# Patient Record
Sex: FEMALE | Race: OTHER | HISPANIC OR LATINO
[De-identification: names, ages, dates, MRNs, and addresses within clinical notes are randomized per-mention and may not be internally consistent; named-entity substitution may affect disease eponyms.]

---

## 2019-04-18 ENCOUNTER — APPOINTMENT (OUTPATIENT)
Dept: SURGERY | Facility: CLINIC | Age: 54
End: 2019-04-18
Payer: COMMERCIAL

## 2019-04-18 VITALS
HEIGHT: 66 IN | HEART RATE: 89 BPM | BODY MASS INDEX: 31.02 KG/M2 | WEIGHT: 193 LBS | DIASTOLIC BLOOD PRESSURE: 85 MMHG | TEMPERATURE: 98.5 F | SYSTOLIC BLOOD PRESSURE: 134 MMHG

## 2019-04-18 PROBLEM — Z00.00 ENCOUNTER FOR PREVENTIVE HEALTH EXAMINATION: Status: ACTIVE | Noted: 2019-04-18

## 2019-04-18 PROCEDURE — 99203 OFFICE O/P NEW LOW 30 MIN: CPT

## 2019-05-17 PROBLEM — Z87.19 HISTORY OF GASTRITIS: Status: RESOLVED | Noted: 2019-05-17 | Resolved: 2019-05-17

## 2019-05-17 PROBLEM — Z56.0 UNEMPLOYED: Status: ACTIVE | Noted: 2019-05-17

## 2019-05-17 PROBLEM — Z86.39 HISTORY OF HIGH CHOLESTEROL: Status: RESOLVED | Noted: 2019-05-17 | Resolved: 2019-05-17

## 2019-05-17 PROBLEM — Z86.59 HISTORY OF DEPRESSION: Status: RESOLVED | Noted: 2019-05-17 | Resolved: 2019-05-17

## 2019-05-17 PROBLEM — Z82.49 FH: HEART ATTACK: Status: ACTIVE | Noted: 2019-05-17

## 2019-05-17 PROBLEM — Z86.39 HISTORY OF OBESITY: Status: RESOLVED | Noted: 2019-05-17 | Resolved: 2019-05-17

## 2019-05-17 PROBLEM — Z87.39 HISTORY OF FIBROMYALGIA: Status: RESOLVED | Noted: 2019-05-17 | Resolved: 2019-05-17

## 2019-05-17 PROBLEM — Z82.49 FAMILY HISTORY OF ESSENTIAL HYPERTENSION: Status: ACTIVE | Noted: 2019-05-17

## 2019-05-17 PROBLEM — Z86.59 HISTORY OF BIPOLAR DISORDER: Status: RESOLVED | Noted: 2019-05-17 | Resolved: 2019-05-17

## 2019-05-17 PROBLEM — Z87.19 HISTORY OF APPENDICITIS: Status: RESOLVED | Noted: 2019-05-17 | Resolved: 2019-05-17

## 2019-05-17 PROBLEM — F17.210 CIGARETTE SMOKER: Status: ACTIVE | Noted: 2019-05-17

## 2019-05-17 PROBLEM — Z86.59 HISTORY OF PANIC ATTACKS: Status: RESOLVED | Noted: 2019-05-17 | Resolved: 2019-05-17

## 2019-05-20 ENCOUNTER — APPOINTMENT (OUTPATIENT)
Dept: SURGERY | Facility: CLINIC | Age: 54
End: 2019-05-20
Payer: COMMERCIAL

## 2019-05-20 VITALS
TEMPERATURE: 98.6 F | SYSTOLIC BLOOD PRESSURE: 110 MMHG | WEIGHT: 197 LBS | BODY MASS INDEX: 31.66 KG/M2 | HEIGHT: 66 IN | HEART RATE: 85 BPM | DIASTOLIC BLOOD PRESSURE: 72 MMHG

## 2019-05-20 DIAGNOSIS — Z56.0 UNEMPLOYMENT, UNSPECIFIED: ICD-10-CM

## 2019-05-20 DIAGNOSIS — Z82.49 FAMILY HISTORY OF ISCHEMIC HEART DISEASE AND OTHER DISEASES OF THE CIRCULATORY SYSTEM: ICD-10-CM

## 2019-05-20 DIAGNOSIS — Z86.59 PERSONAL HISTORY OF OTHER MENTAL AND BEHAVIORAL DISORDERS: ICD-10-CM

## 2019-05-20 DIAGNOSIS — Z87.39 PERSONAL HISTORY OF OTHER DISEASES OF THE MUSCULOSKELETAL SYSTEM AND CONNECTIVE TISSUE: ICD-10-CM

## 2019-05-20 DIAGNOSIS — Z87.19 PERSONAL HISTORY OF OTHER DISEASES OF THE DIGESTIVE SYSTEM: ICD-10-CM

## 2019-05-20 DIAGNOSIS — Z86.39 PERSONAL HISTORY OF OTHER ENDOCRINE, NUTRITIONAL AND METABOLIC DISEASE: ICD-10-CM

## 2019-05-20 DIAGNOSIS — F17.210 NICOTINE DEPENDENCE, CIGARETTES, UNCOMPLICATED: ICD-10-CM

## 2019-05-20 PROCEDURE — 99213 OFFICE O/P EST LOW 20 MIN: CPT

## 2019-05-20 SDOH — ECONOMIC STABILITY - INCOME SECURITY: UNEMPLOYMENT, UNSPECIFIED: Z56.0

## 2019-05-20 NOTE — REVIEW OF SYSTEMS
[As Noted in HPI] : as noted in HPI [Abdominal Pain] : abdominal pain [Vomiting] : no vomiting [Constipation] : no constipation [Diarrhea] : no diarrhea [Heartburn] : no heartburn [Melena] : no melena [Negative] : Heme/Lymph

## 2019-05-20 NOTE — DATA REVIEWED
[FreeTextEntry1] : RADIOLOGY REPORT   FINDINGS   FINDING History: Evaluate for gallstone.  Comparison: None available.  Technique: High resolution gray-scale sonographic evaluation of the abdomen was performed.  The liver is diffusely echogenic. No focal hepatic mass is identified. The gallbladder is distended with gallstones. There is no significant gallbladder wall thickening or pericholecystic fluid. The common bile duct is normal caliber measuring 4 mm. The pancreas is partially visualized. The spleen is normal in size measuring 12.1 cm length.   The right kidney measures 11.7 cm length. The left kidney measures 12.3 cm length. No evidence of hydronephrosis, perinephric fluid or renal calculus is noted.  The visualized portion of the abdominal aorta and IVC are unremarkable.  Impression:  Findings consistent with diffuse hepatic steatosis versus underlying hepatocellular disease. Cholelithiasis without sonographic evidence for acute cholecystitis.  Electronically signed by: Kendra Caruso MD 4/25/2019 10:34 AM   Workstation: [id number]

## 2019-05-20 NOTE — PHYSICAL EXAM
[Abdominal Masses] : No abdominal masses [Alert] : alert [Oriented to Person] : oriented to person [Oriented to Place] : oriented to place [Oriented to Time] : oriented to time [Calm] : calm [de-identified] : The patient is alert, well-groomed, and cheerful. [de-identified] :  Neck supple. Trachea midline. Thyroid isthmus barely palpable, lobes not felt. [de-identified] : No signs of  dyspnea, orthopnea. Good S1 and  S2 [de-identified] :  mild right upper quadrant tenderness with no guarding and no rebound

## 2019-05-20 NOTE — PLAN
[FreeTextEntry1] : \par Patient was told significance of findings, options, risks and benefits were explained.  We will arrange for pre-surgical testing and schedule the patient for Laparoscopic/ possible open gallbladder  removal at [facility].

## 2019-05-20 NOTE — HISTORY OF PRESENT ILLNESS
[de-identified] : This is a 54 years old patient who  presented on 04/18/2019 with the chief complaint of having right upper quadrant and epigastric pain for 3 years.  patient had results of the abd sonogram from 2015 that showed multiple GB stones. Patient was areferred for a repeat US of the abdomen that she had on 04/25/2019. the results showed Cholelithiasis without sonographic evidence for acute cholecystitis. today patient states that she has episodes of right upper quadrant pain . On Examination patient has  mild right upper quadrant tenderness with no guarding and no rebound. Patient reports good bowel movements and appetite. \par \par

## 2019-05-20 NOTE — ASSESSMENT
[FreeTextEntry1] : This is a 54 years old patient who  presented on 04/18/2019 with the chief complaint of having right upper quadrant and epigastric pain for 3 years.  patient had results of the abd sonogram from 2015 that showed multiple GB stones. Patient was areferred for a repeat US of the abdomen that she had on 04/25/2019. the results showed Cholelithiasis without sonographic evidence for acute cholecystitis. today patient states that she has episodes of right upper quadrant pain . On Examination patient has  mild right upper quadrant tenderness with no guarding and no rebound. Patient reports good bowel movements and appetite. results of the US and treatment/ no treatment  options discussed in details. patient is asking for a surgical GB removal

## 2019-06-07 ENCOUNTER — OUTPATIENT (OUTPATIENT)
Dept: OUTPATIENT SERVICES | Facility: HOSPITAL | Age: 54
LOS: 1 days | End: 2019-06-07
Payer: COMMERCIAL

## 2019-06-07 VITALS
TEMPERATURE: 98 F | HEART RATE: 80 BPM | HEIGHT: 66 IN | RESPIRATION RATE: 18 BRPM | SYSTOLIC BLOOD PRESSURE: 121 MMHG | OXYGEN SATURATION: 100 % | WEIGHT: 194.01 LBS | DIASTOLIC BLOOD PRESSURE: 84 MMHG

## 2019-06-07 DIAGNOSIS — Z90.49 ACQUIRED ABSENCE OF OTHER SPECIFIED PARTS OF DIGESTIVE TRACT: Chronic | ICD-10-CM

## 2019-06-07 DIAGNOSIS — Z98.890 OTHER SPECIFIED POSTPROCEDURAL STATES: Chronic | ICD-10-CM

## 2019-06-07 DIAGNOSIS — E78.5 HYPERLIPIDEMIA, UNSPECIFIED: ICD-10-CM

## 2019-06-07 DIAGNOSIS — F32.9 MAJOR DEPRESSIVE DISORDER, SINGLE EPISODE, UNSPECIFIED: ICD-10-CM

## 2019-06-07 DIAGNOSIS — K81.9 CHOLECYSTITIS, UNSPECIFIED: ICD-10-CM

## 2019-06-07 DIAGNOSIS — K29.70 GASTRITIS, UNSPECIFIED, WITHOUT BLEEDING: ICD-10-CM

## 2019-06-07 DIAGNOSIS — Z01.818 ENCOUNTER FOR OTHER PREPROCEDURAL EXAMINATION: ICD-10-CM

## 2019-06-07 DIAGNOSIS — K80.20 CALCULUS OF GALLBLADDER WITHOUT CHOLECYSTITIS WITHOUT OBSTRUCTION: ICD-10-CM

## 2019-06-07 DIAGNOSIS — Z90.89 ACQUIRED ABSENCE OF OTHER ORGANS: Chronic | ICD-10-CM

## 2019-06-07 DIAGNOSIS — Z98.42 CATARACT EXTRACTION STATUS, LEFT EYE: Chronic | ICD-10-CM

## 2019-06-07 DIAGNOSIS — Z98.41 CATARACT EXTRACTION STATUS, RIGHT EYE: Chronic | ICD-10-CM

## 2019-06-07 DIAGNOSIS — Z98.84 BARIATRIC SURGERY STATUS: Chronic | ICD-10-CM

## 2019-06-07 DIAGNOSIS — Z98.891 HISTORY OF UTERINE SCAR FROM PREVIOUS SURGERY: Chronic | ICD-10-CM

## 2019-06-07 LAB — BLD GP AB SCN SERPL QL: SIGNIFICANT CHANGE UP

## 2019-06-07 PROCEDURE — 86900 BLOOD TYPING SEROLOGIC ABO: CPT

## 2019-06-07 PROCEDURE — G0463: CPT

## 2019-06-07 PROCEDURE — 86901 BLOOD TYPING SEROLOGIC RH(D): CPT

## 2019-06-07 PROCEDURE — 36415 COLL VENOUS BLD VENIPUNCTURE: CPT

## 2019-06-07 PROCEDURE — 86850 RBC ANTIBODY SCREEN: CPT

## 2019-06-07 NOTE — H&P PST ADULT - ASSESSMENT
4 yr old female with PMH of  depression, fibromyalgia, panic attacks, gastritis, bipolar disorder, Hyperlipidemia presents with cholelithiasis. Pt is scheduled for laparoscopic cholecystectomy with intraoperative cholangiogram, possible open on 6/12/2019.

## 2019-06-07 NOTE — H&P PST ADULT - NSICDXFAMILYHX_GEN_ALL_CORE_FT
FAMILY HISTORY:  Family history of diabetes mellitus in mother  Family history of hyperlipidemia  Family history of hypertension in mother  Family history of myocardial infarction  Family history of polycystic kidney disease  Family history of renal disease  Family history of renal failure

## 2019-06-07 NOTE — H&P PST ADULT - NSANTHOSAYNRD_GEN_A_CORE
No. KITTY screening performed.  STOP BANG Legend: 0-2 = LOW Risk; 3-4 = INTERMEDIATE Risk; 5-8 = HIGH Risk

## 2019-06-07 NOTE — H&P PST ADULT - NSICDXPROBLEM_GEN_ALL_CORE_FT
PROBLEM DIAGNOSES  Problem: Gastritis  Assessment and Plan: Continue Dexilant and take with sips of water on day of surgery.    Problem: HLD (hyperlipidemia)  Assessment and Plan:     Problem: Depression  Assessment and Plan: Encouraged to continue meds and take with sips of water on day of surgery. Follow-up with PROVID[facility] FOR MANAGEMENT.    Problem: Cholelithiasis  Assessment and Plan: Laparoscopic cholecystectomy with intraoperative cholangiogram, possible open on 6/12/2019. Preoperative instructions given

## 2019-06-07 NOTE — H&P PST ADULT - NSICDXPASTMEDICALHX_GEN_ALL_CORE_FT
PAST MEDICAL HISTORY:  Cholelithiasis     Depression     Fibromyalgia     Gastritis     HLD (hyperlipidemia)     Panic attacks

## 2019-06-07 NOTE — H&P PST ADULT - NSICDXPASTSURGICALHX_GEN_ALL_CORE_FT
PAST SURGICAL HISTORY:  H/O gastric bypass gastric sleeve in 2010    History of abdominoplasty     History of appendectomy     History of  section     History of tonsillectomy PAST SURGICAL HISTORY:  H/O gastric bypass gastric sleeve in     H/O right cataract extraction 2019    History of abdominoplasty     History of appendectomy     History of  section     History of left cataract extraction 2019    History of tonsillectomy

## 2019-06-07 NOTE — H&P PST ADULT - RS GEN PE MLT RESP DETAILS PC
no rhonchi/no wheezes/good air movement/normal/respirations non-labored/breath sounds equal/no rales/airway patent/no chest wall tenderness/no intercostal retractions/clear to auscultation bilaterally/no subcutaneous emphysema

## 2019-06-07 NOTE — H&P PST ADULT - HISTORY OF PRESENT ILLNESS
54 yr old female with PMH of  depression, fibromyalgia, panic attacks, gastritis, bipolar disorder, Hyperlipidemia presents with c/o intermittent abdominal pain due to gallstones. Pt reports feeling nauseous before meals and worsening of pain after ingestion of fatty meals. Pt for laparoscopic cholecystectomy with intraoperative cholangiogram, possible open on 6/12/2019. 54 yr old female with PMH of  depression, fibromyalgia, panic attacks, gastritis, bipolar disorder, Hyperlipidemia presents with c/o intermittent abdominal pain due to gallstones. Pt reports feeling bloated and worsening of pain after ingestion of  meals. Pt for laparoscopic cholecystectomy with intraoperative cholangiogram, possible open on 6/12/2019.

## 2019-06-11 ENCOUNTER — TRANSCRIPTION ENCOUNTER (OUTPATIENT)
Age: 54
End: 2019-06-11

## 2019-06-12 ENCOUNTER — OUTPATIENT (OUTPATIENT)
Dept: OUTPATIENT SERVICES | Facility: HOSPITAL | Age: 54
LOS: 1 days | End: 2019-06-12
Payer: COMMERCIAL

## 2019-06-12 ENCOUNTER — APPOINTMENT (OUTPATIENT)
Dept: SURGERY | Facility: HOSPITAL | Age: 54
End: 2019-06-12
Payer: COMMERCIAL

## 2019-06-12 ENCOUNTER — RESULT REVIEW (OUTPATIENT)
Age: 54
End: 2019-06-12

## 2019-06-12 VITALS
RESPIRATION RATE: 16 BRPM | OXYGEN SATURATION: 98 % | WEIGHT: 194.01 LBS | SYSTOLIC BLOOD PRESSURE: 133 MMHG | HEART RATE: 71 BPM | HEIGHT: 66 IN | DIASTOLIC BLOOD PRESSURE: 71 MMHG | TEMPERATURE: 99 F

## 2019-06-12 VITALS
SYSTOLIC BLOOD PRESSURE: 116 MMHG | DIASTOLIC BLOOD PRESSURE: 76 MMHG | TEMPERATURE: 98 F | RESPIRATION RATE: 16 BRPM | OXYGEN SATURATION: 100 % | HEART RATE: 77 BPM

## 2019-06-12 DIAGNOSIS — K81.9 CHOLECYSTITIS, UNSPECIFIED: ICD-10-CM

## 2019-06-12 DIAGNOSIS — Z90.89 ACQUIRED ABSENCE OF OTHER ORGANS: Chronic | ICD-10-CM

## 2019-06-12 DIAGNOSIS — Z98.84 BARIATRIC SURGERY STATUS: Chronic | ICD-10-CM

## 2019-06-12 DIAGNOSIS — Z98.890 OTHER SPECIFIED POSTPROCEDURAL STATES: Chronic | ICD-10-CM

## 2019-06-12 DIAGNOSIS — Z98.41 CATARACT EXTRACTION STATUS, RIGHT EYE: Chronic | ICD-10-CM

## 2019-06-12 DIAGNOSIS — Z01.818 ENCOUNTER FOR OTHER PREPROCEDURAL EXAMINATION: ICD-10-CM

## 2019-06-12 DIAGNOSIS — Z90.49 ACQUIRED ABSENCE OF OTHER SPECIFIED PARTS OF DIGESTIVE TRACT: Chronic | ICD-10-CM

## 2019-06-12 DIAGNOSIS — Z98.891 HISTORY OF UTERINE SCAR FROM PREVIOUS SURGERY: Chronic | ICD-10-CM

## 2019-06-12 DIAGNOSIS — Z98.42 CATARACT EXTRACTION STATUS, LEFT EYE: Chronic | ICD-10-CM

## 2019-06-12 LAB — BLD GP AB SCN SERPL QL: SIGNIFICANT CHANGE UP

## 2019-06-12 PROCEDURE — 88304 TISSUE EXAM BY PATHOLOGIST: CPT

## 2019-06-12 PROCEDURE — 86850 RBC ANTIBODY SCREEN: CPT

## 2019-06-12 PROCEDURE — 86900 BLOOD TYPING SEROLOGIC ABO: CPT

## 2019-06-12 PROCEDURE — 76000 FLUOROSCOPY <1 HR PHYS/QHP: CPT

## 2019-06-12 PROCEDURE — 36415 COLL VENOUS BLD VENIPUNCTURE: CPT

## 2019-06-12 PROCEDURE — 47563 LAPARO CHOLECYSTECTOMY/GRAPH: CPT | Mod: AS

## 2019-06-12 PROCEDURE — 47563 LAPARO CHOLECYSTECTOMY/GRAPH: CPT

## 2019-06-12 PROCEDURE — 88304 TISSUE EXAM BY PATHOLOGIST: CPT | Mod: 26

## 2019-06-12 PROCEDURE — 86901 BLOOD TYPING SEROLOGIC RH(D): CPT

## 2019-06-12 RX ORDER — FLUOXETINE HCL 10 MG
2 CAPSULE ORAL
Qty: 0 | Refills: 0 | DISCHARGE

## 2019-06-12 RX ORDER — NEPAFENAC 3 MG/ML
1 SUSPENSION OPHTHALMIC
Qty: 0 | Refills: 0 | DISCHARGE

## 2019-06-12 RX ORDER — HYDROMORPHONE HYDROCHLORIDE 2 MG/ML
1 INJECTION INTRAMUSCULAR; INTRAVENOUS; SUBCUTANEOUS
Refills: 0 | Status: DISCONTINUED | OUTPATIENT
Start: 2019-06-12 | End: 2019-06-12

## 2019-06-12 RX ORDER — ONDANSETRON 8 MG/1
4 TABLET, FILM COATED ORAL ONCE
Refills: 0 | Status: DISCONTINUED | OUTPATIENT
Start: 2019-06-12 | End: 2019-06-12

## 2019-06-12 RX ORDER — HYDROMORPHONE HYDROCHLORIDE 2 MG/ML
0.5 INJECTION INTRAMUSCULAR; INTRAVENOUS; SUBCUTANEOUS
Refills: 0 | Status: DISCONTINUED | OUTPATIENT
Start: 2019-06-12 | End: 2019-06-12

## 2019-06-12 RX ORDER — OXCARBAZEPINE 300 MG/1
1.5 TABLET, FILM COATED ORAL
Qty: 0 | Refills: 0 | DISCHARGE

## 2019-06-12 RX ORDER — LINACLOTIDE 145 UG/1
1 CAPSULE, GELATIN COATED ORAL
Qty: 0 | Refills: 0 | DISCHARGE

## 2019-06-12 RX ORDER — ACETAMINOPHEN 500 MG
650 TABLET ORAL ONCE
Refills: 0 | Status: COMPLETED | OUTPATIENT
Start: 2019-06-12 | End: 2019-06-12

## 2019-06-12 RX ORDER — ACETAMINOPHEN 500 MG
2 TABLET ORAL
Qty: 0 | Refills: 0 | DISCHARGE

## 2019-06-12 RX ORDER — OXCARBAZEPINE 300 MG/1
0.5 TABLET, FILM COATED ORAL
Qty: 0 | Refills: 0 | DISCHARGE

## 2019-06-12 RX ORDER — IBUPROFEN 200 MG
1 TABLET ORAL
Qty: 0 | Refills: 0 | DISCHARGE

## 2019-06-12 RX ORDER — SODIUM CHLORIDE 9 MG/ML
1000 INJECTION, SOLUTION INTRAVENOUS
Refills: 0 | Status: DISCONTINUED | OUTPATIENT
Start: 2019-06-12 | End: 2019-06-12

## 2019-06-12 RX ORDER — ACETAMINOPHEN WITH CODEINE 300MG-30MG
1 TABLET ORAL EVERY 4 HOURS
Refills: 0 | Status: DISCONTINUED | OUTPATIENT
Start: 2019-06-12 | End: 2019-06-12

## 2019-06-12 RX ORDER — ROSUVASTATIN CALCIUM 5 MG/1
1 TABLET ORAL
Qty: 0 | Refills: 0 | DISCHARGE

## 2019-06-12 RX ORDER — SODIUM CHLORIDE 9 MG/ML
1000 INJECTION INTRAMUSCULAR; INTRAVENOUS; SUBCUTANEOUS
Refills: 0 | Status: DISCONTINUED | OUTPATIENT
Start: 2019-06-12 | End: 2019-06-20

## 2019-06-12 RX ORDER — DEXLANSOPRAZOLE 30 MG/1
1 CAPSULE, DELAYED RELEASE ORAL
Qty: 0 | Refills: 0 | DISCHARGE

## 2019-06-12 RX ORDER — SODIUM CHLORIDE 9 MG/ML
3 INJECTION INTRAMUSCULAR; INTRAVENOUS; SUBCUTANEOUS EVERY 8 HOURS
Refills: 0 | Status: DISCONTINUED | OUTPATIENT
Start: 2019-06-12 | End: 2019-06-12

## 2019-06-12 RX ORDER — DUREZOL 0.5 MG/ML
1 EMULSION OPHTHALMIC
Qty: 0 | Refills: 0 | DISCHARGE

## 2019-06-12 RX ORDER — GABAPENTIN 400 MG/1
1 CAPSULE ORAL
Qty: 0 | Refills: 0 | DISCHARGE

## 2019-06-12 RX ADMIN — Medication 650 MILLIGRAM(S): at 12:45

## 2019-06-12 RX ADMIN — Medication 650 MILLIGRAM(S): at 12:15

## 2019-06-12 RX ADMIN — HYDROMORPHONE HYDROCHLORIDE 0.5 MILLIGRAM(S): 2 INJECTION INTRAMUSCULAR; INTRAVENOUS; SUBCUTANEOUS at 10:21

## 2019-06-12 NOTE — PROGRESS NOTE ADULT - SUBJECTIVE AND OBJECTIVE BOX
post op noted blood at lateral port with small hematoma,, expressed,,, pressure dressing applied .  In pacu, pt remains stable with vitals normal. no evidence of further bleed. dressing cdi. Abd: soft nt nd. no evidence of hematoma.    Dr Sood notified. Pt planned for discharge.

## 2019-06-12 NOTE — ASU DISCHARGE PLAN (ADULT/PEDIATRIC) - PAIN MANAGEMENT
Prescriptions electronically submitted to pharmacy from doctor's office/Prescriptions electronically submitted to pharmacy from [facility]

## 2019-06-12 NOTE — ASU DISCHARGE PLAN (ADULT/PEDIATRIC) - CALL YOUR DOCTOR IF YOU HAVE ANY OF THE FOLLOWING:
Fever greater than (need to indicate Fahrenheit or Celsius) Fever greater than (need to indicate Fahrenheit or Celsius)/Pain not relieved by Medications/Wound/Surgical Site with redness, or foul smelling discharge or pus

## 2019-06-12 NOTE — ASU DISCHARGE PLAN (ADULT/PEDIATRIC) - ASU DC SPECIAL INSTRUCTIONSFT
Keep dressing dry, clean, intact, for 3 days. After 3 days, remove dressing and leave steri strips on. You can shower with steri strips on.  If steri-strip falls off after shower its OK, if not you leave it there, it will fall off by itself in 2-3 days.

## 2019-06-13 RX ORDER — KETOROLAC TROMETHAMINE 30 MG/ML
1 SYRINGE (ML) INJECTION
Qty: 20 | Refills: 0
Start: 2019-06-13 | End: 2019-06-17

## 2019-06-17 PROBLEM — K80.20 CALCULUS OF GALLBLADDER WITHOUT CHOLECYSTITIS WITHOUT OBSTRUCTION: Chronic | Status: ACTIVE | Noted: 2019-06-07

## 2019-06-17 PROBLEM — E78.5 HYPERLIPIDEMIA, UNSPECIFIED: Chronic | Status: ACTIVE | Noted: 2019-06-07

## 2019-06-17 PROBLEM — F32.9 MAJOR DEPRESSIVE DISORDER, SINGLE EPISODE, UNSPECIFIED: Chronic | Status: ACTIVE | Noted: 2019-06-07

## 2019-06-17 PROBLEM — K29.70 GASTRITIS, UNSPECIFIED, WITHOUT BLEEDING: Chronic | Status: ACTIVE | Noted: 2019-06-07

## 2019-06-17 PROBLEM — M79.7 FIBROMYALGIA: Chronic | Status: ACTIVE | Noted: 2019-06-07

## 2019-06-17 PROBLEM — F41.0 PANIC DISORDER [EPISODIC PAROXYSMAL ANXIETY]: Chronic | Status: ACTIVE | Noted: 2019-06-07

## 2019-06-17 PROBLEM — K80.20 CHOLELITHIASIS: Status: ACTIVE | Noted: 2019-05-17

## 2019-06-17 LAB — SURGICAL PATHOLOGY STUDY: SIGNIFICANT CHANGE UP

## 2019-06-17 RX ORDER — FLUOXETINE HYDROCHLORIDE 60 MG/1
TABLET ORAL
Refills: 0 | Status: ACTIVE | COMMUNITY

## 2019-06-17 RX ORDER — ROSUVASTATIN CALCIUM 5 MG/1
TABLET, FILM COATED ORAL
Refills: 0 | Status: ACTIVE | COMMUNITY

## 2019-06-17 RX ORDER — PREGABALIN 300 MG/1
CAPSULE ORAL
Refills: 0 | Status: ACTIVE | COMMUNITY

## 2019-06-24 ENCOUNTER — APPOINTMENT (OUTPATIENT)
Dept: SURGERY | Facility: CLINIC | Age: 54
End: 2019-06-24
Payer: COMMERCIAL

## 2019-06-24 DIAGNOSIS — K80.20 CALCULUS OF GALLBLADDER W/OUT CHOLECYSTITIS W/OUT OBSTRUCTION: ICD-10-CM

## 2019-06-24 PROCEDURE — 99024 POSTOP FOLLOW-UP VISIT: CPT

## 2019-06-24 NOTE — HISTORY OF PRESENT ILLNESS
[de-identified] : Patient is s/p laparoscopic  cholecystectomy  on 06/12/2019.  Today patient offers no complaints. patient reports no fever, chills,  or  pain.  Surgical wounds are  healing well. No signs of inflammation, infection or exudate. patient reports occasional discomfort in the right lower quadrant when she drives.  Patient reports good bowel movements and appetite. \par

## 2019-06-24 NOTE — PHYSICAL EXAM
[de-identified] : The patient is alert, well-groomed, and cheerful. [de-identified] : Surgical wounds are  healing well.   no signs of  inflammation or infection.

## 2019-06-24 NOTE — ASSESSMENT
[FreeTextEntry1] : Patient is doing well, with excellent post-operative recovery. All surgical incisions are healing well and as expected. There is no evidence of infection or complication, and patient is progressing as expected. Post-operative wound care, activity, restrictions and precautions reinforced. Patient instructed to refrain from any heavy lifting greater than 10-15 pounds for at least 4  weeks post-operatively. path discussed.  Patient's questions and concerns addressed to patient's satisfaction.\par

## 2019-07-12 ENCOUNTER — APPOINTMENT (OUTPATIENT)
Dept: SURGERY | Facility: HOSPITAL | Age: 54
End: 2019-07-12

## 2023-08-14 NOTE — H&P PST ADULT - NS HIV RISK FACTOR NO
Asc Procedure Text (A): After obtaining clear surgical margins the patient was sent to an ASC for surgical repair.  The patient understands they will receive post-surgical care and follow-up from the ASC physician. No, Declined

## 2024-07-22 ENCOUNTER — LABORATORY RESULT (OUTPATIENT)
Age: 59
End: 2024-07-22

## 2024-07-22 ENCOUNTER — APPOINTMENT (OUTPATIENT)
Dept: CARDIOLOGY | Facility: CLINIC | Age: 59
End: 2024-07-22

## 2024-07-22 ENCOUNTER — NON-APPOINTMENT (OUTPATIENT)
Age: 59
End: 2024-07-22

## 2024-07-22 VITALS
HEART RATE: 80 BPM | BODY MASS INDEX: 25.88 KG/M2 | HEIGHT: 66 IN | SYSTOLIC BLOOD PRESSURE: 115 MMHG | DIASTOLIC BLOOD PRESSURE: 77 MMHG | WEIGHT: 161 LBS | OXYGEN SATURATION: 97 %

## 2024-07-22 DIAGNOSIS — I45.81 LONG QT SYNDROME: ICD-10-CM

## 2024-07-22 DIAGNOSIS — I42.8 OTHER CARDIOMYOPATHIES: ICD-10-CM

## 2024-07-22 DIAGNOSIS — Z82.41 FAMILY HISTORY OF SUDDEN CARDIAC DEATH: ICD-10-CM

## 2024-07-22 DIAGNOSIS — R55 SYNCOPE AND COLLAPSE: ICD-10-CM

## 2024-07-22 PROCEDURE — 93000 ELECTROCARDIOGRAM COMPLETE: CPT

## 2024-07-22 PROCEDURE — 99204 OFFICE O/P NEW MOD 45 MIN: CPT | Mod: 25

## 2024-07-22 NOTE — HISTORY OF PRESENT ILLNESS
[FreeTextEntry1] : ROLDAN SONG is a 58 yo F PMH bipolar disorder, anxiety, HLD, hypertriglyceridemia, preDM . Clinically there is concern for ARVC vs long QT her family history is significant for SCD.  2 months prior had syncope neurology workup was negative, with facial trauma no prodrome, thinks short LOC, had pain in her head but no post syncopal confusion    similar syncopal event 5 yrs ago, with facial trauma broke 5 teeth today she  is referred for a cardiogenomic evaluation

## 2024-07-22 NOTE — PLAN
[TextEntry] :  1.	Informed Consent obtained for the combined cardiac sequencing and Del/Dup panel 138 genes (#685)   2.	Blood drawn today to be sent to [facility] for analysis, pending insurance authorization.  3.	 Ms. ROLDAN SONG  was provided an information sheet about her genetic testing.  4.	A follow-up appointment was scheduled in 2-3 months to discuss genetic testing results in person. Results generally return in 6-8 weeks.  For any additional questions please call  Yamileth Simons MS, CGC or Javi Covarrubias MD, PhD at [phone] Time spent counseling the patient during the visit was 60 min. >50% time spent in care coordination /counseling for discussion of cardiac risk and appropriate management.   patient seen with Yamileth Simons MS, CGC for genetic counselling

## 2024-07-22 NOTE — DISCUSSION/SUMMARY
[TextEntry] : We reviewed the risks, benefits, limitations, and implications of genetic testing.  Additionally, we examined the patients motivation for testing, and the emotional ramifications of the test results.  The patient is aware that results may impact family members.  Counseling resources are available if requested.   [place], the Genetic Information Non-discrimination Act protects most people from discrimination in health insurance and employment at firms with over 50 employees.  [place]UL does not protect against use of genetic information by life insurance, disability, or long-term care insurers.  Further information on other limitations is available at [email or web address].   After a review of testing options, the patient elected to the combined cardiac  panel offered through [facility]. . Panels contain 138  genes associated with varying levels of risk for cardiomyopathy and arrythmia. We reviewed the three possible results for each gene on this panel: positive, negative and variant of uncertain significance (VUS).  We discussed that panel testing could result in incidental findings, such as identifying a positive result in a gene with cardiac risks not seen in the current personal or discussed family history. If results are positive, we would discuss the  risks and management options associated with that cardiac condition susceptibility gene and discuss genetic testing for family members.  Pedigree was reviewed with the patient to identify those who should be tested if the patient is positive.  If results are negative or a VUS is identified, the patient would continue to be managed based on personal and family history of their  cardiac condition.

## 2024-07-22 NOTE — PHYSICAL EXAM
[Extraocular Movements Intact] : extraocular movements were intact [Pectus Deformity] : no pectus deformity [Normal] : without joint laxity or contractures [Scoliosis] : no scoliosis [Tremor] : no tremor [de-identified] : 5/5 Ue and LE strength

## 2024-07-22 NOTE — ASSESSMENT
[TextEntry] : ROLDAN OSNG  is a 59 year F  with a history of bipolar disorder, anxiety, HLD, hypertriglyceridemia, . Clinically there is concern for ARVC vs long QT her family history is significant for SCD. The differences between hereditary and sporadic cardiomyopathy, arrythmia and syncope were reviewed with the patient.  She  has elected to undergo genetic testing due to concerns for  personal history, definitive diagnosis, disease management, family history, concern for the health of family members . Results may change medical management for the patient and may affect the healthcare of other family members. She  expressed understanding of the presented information and satisfaction with having all of Her questions and concerns addressed

## 2024-07-22 NOTE — CONSULT LETTER
[Dear  ___] : Dear ~CLOTILDE, [Consult Letter:] : I had the pleasure of evaluating your patient, [unfilled]. [Please see my note below.] : Please see my note below. [Consult Closing:] : Thank you very much for allowing me to participate in the care of this patient.  If you have any questions, please do not hesitate to contact me. [Sincerely,] : Sincerely, [DrPatrice  ___] : Dr. COSTELLO [DrPatrice ___] : Dr. COSTELLO [FreeTextEntry3] : Javi Covarrubias MD, PhD  Medical Director Program for Cardiac Genetics, Genomics and Precision Medicine Department of Cardiology [facility]  [employer] at [address] [place] [address] Tel: [phone] Fax: [phone]  ([facility] office) [address], 3rd floor (between [address] and [address]) [place] [address] (p) [phone] (f) [phone]

## 2024-07-22 NOTE — SIGNATURES
[TextEntry] : Javi Covarrubias MD, PhD  Medical Director Program for Cardiac Genetics, Genomics and Precision Medicine Department of Cardiology [facility]  [employer] at [address] [place] [address] Tel: [phone] Fax: [phone]  ([facility] office) [address], 3rd floor (between [address] and [address]) [place] [address] (p) [phone] (f) [phone]

## 2024-07-22 NOTE — FAMILY HISTORY
[FreeTextEntry1] : FamilyHistory_20_twCiteListControlStart FamilyHistory_20_twCiteListControlEnd [id number]  A four-generation family history was constructed and scanned into [employer].  Family history is significant for:  siblings brother 58 yo, hx "heart attack " 1 year ago, details unclear. father dec 59 hx cardiomyopathy, hx polycystic kidney disease paternal aunt polycystic kidney disease  children: 39 yo son hx palpitations, symptomatic  her maternal families originate from  and paternal families originate from [facility]  No Ashkenazi Catholic ancestry.  Family history was  negative for consanguinity   No family history of SIDS    [FreeTextEntry3] : [place]

## 2024-07-22 NOTE — REASON FOR VISIT
[FreeTextEntry3] : Dear Dr. Cortez and Dr. Ellington        . I saw your patient ROLDAN SONG on 07/22/2024 . Please see the note below for the assessment and plan.   ROLDAN SONG  was seen  for an initial consultation at the Cardiogenomics Program at [facility] on 07/22/2024.   Ms. SONG was referred by Dr. Cortez for hereditary cardiac predisposition risk assessment and counseling, due to hx syncope concern for ARVC and prolonged QT, FH SCD

## 2024-08-02 ENCOUNTER — OUTPATIENT (OUTPATIENT)
Dept: OUTPATIENT SERVICES | Facility: HOSPITAL | Age: 59
LOS: 1 days | End: 2024-08-02

## 2024-08-02 ENCOUNTER — APPOINTMENT (OUTPATIENT)
Dept: MRI IMAGING | Facility: CLINIC | Age: 59
End: 2024-08-02
Payer: COMMERCIAL

## 2024-08-02 DIAGNOSIS — Z98.890 OTHER SPECIFIED POSTPROCEDURAL STATES: Chronic | ICD-10-CM

## 2024-08-02 DIAGNOSIS — I45.10 UNSPECIFIED RIGHT BUNDLE-BRANCH BLOCK: ICD-10-CM

## 2024-08-02 DIAGNOSIS — Z90.49 ACQUIRED ABSENCE OF OTHER SPECIFIED PARTS OF DIGESTIVE TRACT: Chronic | ICD-10-CM

## 2024-08-02 DIAGNOSIS — Z98.891 HISTORY OF UTERINE SCAR FROM PREVIOUS SURGERY: Chronic | ICD-10-CM

## 2024-08-02 DIAGNOSIS — Z98.41 CATARACT EXTRACTION STATUS, RIGHT EYE: Chronic | ICD-10-CM

## 2024-08-02 DIAGNOSIS — Z98.42 CATARACT EXTRACTION STATUS, LEFT EYE: Chronic | ICD-10-CM

## 2024-08-02 DIAGNOSIS — R55 SYNCOPE AND COLLAPSE: ICD-10-CM

## 2024-08-02 DIAGNOSIS — I42.8 OTHER CARDIOMYOPATHIES: ICD-10-CM

## 2024-08-02 PROCEDURE — 75565 CARD MRI VELOC FLOW MAPPING: CPT | Mod: 26

## 2024-08-02 PROCEDURE — 75561 CARDIAC MRI FOR MORPH W/DYE: CPT | Mod: 26

## 2024-09-15 ENCOUNTER — NON-APPOINTMENT (OUTPATIENT)
Age: 59
End: 2024-09-15

## 2024-09-16 ENCOUNTER — APPOINTMENT (OUTPATIENT)
Dept: CARDIOLOGY | Facility: CLINIC | Age: 59
End: 2024-09-16
Payer: COMMERCIAL

## 2024-09-16 ENCOUNTER — NON-APPOINTMENT (OUTPATIENT)
Age: 59
End: 2024-09-16

## 2024-09-16 VITALS
HEIGHT: 66 IN | SYSTOLIC BLOOD PRESSURE: 121 MMHG | BODY MASS INDEX: 24.91 KG/M2 | DIASTOLIC BLOOD PRESSURE: 81 MMHG | OXYGEN SATURATION: 99 % | WEIGHT: 155 LBS | HEART RATE: 67 BPM

## 2024-09-16 PROCEDURE — 93000 ELECTROCARDIOGRAM COMPLETE: CPT

## 2024-09-16 PROCEDURE — 99215 OFFICE O/P EST HI 40 MIN: CPT | Mod: 25

## 2024-09-18 NOTE — CONSULT LETTER
[Dear  ___] : Dear ~CLOTILDE, [Consult Letter:] : I had the pleasure of evaluating your patient, [unfilled]. [Please see my note below.] : Please see my note below. [Consult Closing:] : Thank you very much for allowing me to participate in the care of this patient.  If you have any questions, please do not hesitate to contact me. [Sincerely,] : Sincerely, [DrPatrice  ___] : Dr. COSTELLO [DrPatrice ___] : Dr. COSTELLO [FreeTextEntry3] : Javi Covarrubias MD, PhD  Medical Director Program for Cardiac Genetics, Genomics and Precision Medicine Department of Cardiology [facility]  [employer] at [address] [place] [address] Tel: [phone] Fax: [phone]  ([facility] office) [address], 3rd floor (between [address] and [address]) [place] [address] (p) [phone] (f) [phone]

## 2024-09-18 NOTE — PHYSICAL EXAM
[Extraocular Movements Intact] : extraocular movements were intact [Normal] : without joint laxity or contractures [Pectus Deformity] : no pectus deformity [Scoliosis] : no scoliosis [Tremor] : no tremor [de-identified] : 5/5 Ue and LE strength

## 2024-09-18 NOTE — DISCUSSION/SUMMARY
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   No known pathogenic variants were identified in any of the 138 genes evaluated  a variant of uncertain significance (VUS)  was identified in the MYH6  gene that is inconclusive and low clinical suspicion.  She was also found to be a carrier for hemochromatosis  the limitations of genetic testing were discussed with ROLDAN SONG     for her recommend continued care as per her cardiologist. Agree with the loop monitor recordings. Consider avoiding QT prolonging medications as listed on [email or web address]  for her family At this time we recommend all of her  first degree relatives undergo a clinical cardiac evaluation with history, physical exam, EKG and ECHO. If their initial clinical evaluation is normal they should continued to have clinical cardiac evaluation every 3-5 years (for adults).   any family members who wish to determine if they are also carriers for hemochromatosis can undergo testing via medical genetics or with their PCP     HFE carrier A single pathogenic homozygous recessive variant was detected in the "human hemachromatosis protein" (High Iron Fe)  HFE gene.  He is heterozygous, and carries 1 copy of the H63D mutation.  This is consistent with a diagnosis of being a carrier hemochromatosis. The HFE gene encodes a cell surface protein that interacts with the transferrin receptors to regulate cellular iron uptake.  Missense mutations in the HFE are responsible for autosomal recessive hereditary hemochromatosis.  Individuals  with hemochromatosis have excess iron deposition in various tissue lading to pain, lethargy, DM, Cirrhosis and cardiomyopathies.   There is variable penetrance and some individuals lack clinical symptoms but have evidence of tissue iron overload.  The H63D variant is known to disrupt normal protein function. Carriers of the H63D mutation are recommended to have iron studies including serum ferritin and transferrin saturation.  .name 's children have a 50% chance of being carriers and should undergo genetic testing and genetic counselling pre-conception. Targeted testing for other family members to determine if they are carriers may be considered.  Patients heterozygous for HFE H63D have been shown to have a more complicated course following hepatitis C infection and more aggressive cirrhosis with alcoholism. This was also discussed with the patient.     any family members who wish to determine if they are also carriers for hemochromatosis can undergo testing via medical genetics or with their PCP  from genedx report  HFE Expressed primarily in the liver and intestines, the HFE gene encodes a cell surface protein that interacts with transferrin receptors to regulate cellular iron uptake (PMID: 32813124). Missense pathogenic variants in HFE can cause autosomal recessive hereditary hemochromatosis (HH), a highly phenotypically variable disorder with markedly reduced penetrance. In clinically affected individuals, excess iron is absorbed by the digestive tract and stored in various tissues, causing symptoms such as pain, lethargy, increased skin pigmentation, weight loss, arthritis, hepatic cirrhosis, diabetes, cardiomyopathy, and arrhythmias (PMID: 42129908, 42873301). At least half of individuals homozygous for C282Y, the most common pathogenic variant, do not develop clinical or biochemical evidence of iron overload (PMID: 75234930). In addition, some individuals develop biochemical evidence of HH but do not have clinical symptoms. Additionally, only a small proportion of adults with compound heterozygosity of C282Y/H63D and C282Y/S65C or homozygosity of H63D develop mild iron overload and this is usually in the presence of concomitant liver disease (PMID: 42435843). Males are more likely than females to have clinical symptoms of HH. Although missense pathogenic variants account for most cases of HH, truncating pathogenic variants have also been reported (PMID: 81575502). Pathogenic variants in HFE have also been associated with an increased susceptibility to other disorders that may be influenced by iron levels, including porphyria and Alzheimers disease (PMID: 97420399, 65484605, 33880335). p.(Gbm67Dkj) (CAT>GAT): c.187 C>G in exon 2 of the HFE gene (NM_000410.3) Common variant associated with hereditary hemochromatosis Observed in the homozygous state in multiple unrelated patients in published literature associated with elevated serum transferrin and transferrin saturation, but clinical symptoms and iron overload were not increased compared to individuals without an HFE variant (PMID: 33700147, 4021757) Published functional studies demonstrate a damaging effect (PMID: 93343225, 58974591) May be more clinically relevant when present in the compound heterozygous state with the p.(C282Y) variant, although most individuals do not develop clinical hemochromatosis symptoms, even if they have biochemical parameters consistent with hemochromatosis (PMID: 14813228, 33582664) When present with p.(C282Y), the variants are likely inherited in trans (on separate alleles) as they have been reported to have arisen as independent  alleles (PMID: 41754453) We interpret this as a Pathogenic Variant. -- MYH6 GENE SUMMARY MYH6 encodes the alpha heavy chain subunit of the cardiac myosin protein, which is a component of cardiac muscle (PMID: 18805998). Pathogenic variants in the MYH6 gene are associated with various autosomal dominant cardiac conditions primarily including cardiomyopathies and congenital heart malformations, all characterized by reduced penetrance and variable expressivity (PMID: 90322187, 62943827, 21688805, 39249869). Dilated (DCM) and hypertrophic (HCM) cardiomyopathies are most commonly described, although arrhythmogenic (ACM), left ventricular noncompaction (LVNC), and peripartum cardiomyopathies are also reported (PMID: 22819764, 42070208, 13394889). Various MYH6-related congenital heart malformations include atrial septal defects, tricuspid atresia, hypoplastic left heart syndrome, coarctation of the aorta, and Shone's complex, among others (PMID: 28572748, 33539860, 83608030, 63863077). In addition, various arrhythmogenic changes in the absence of cardiomyopathy and/or congenital heart malformations have been reported (PMID: 74972106, 70165356, 18584877, 21349751); however, additional evidence is needed to further characterize a possible association between variants in the MYH6 gene and other cardiac phenotypes. The majority of reported pathogenic variants in the MYH6 gene are missense variants (HGMD). p.(Mqv323Zsx) (CGC>TGC): c.1702 C>T in exon 15 of the MYH6 gene (NM_002471.3) Identified in a patient with DCM, a patient with LVNC, and a patient who  suddenly in his sleep with HCM on autopsy who also harbored a pathogenic variant in the MYBPC3 gene (PMID: 77856565, 84828436, 49732135) Observed in large population cohorts (gnomAD; internal data) In silico analysis supports that this missense variant has a deleterious effect on protein structure/function We interpret this as a Variant of Uncertain Significance.

## 2024-09-18 NOTE — PLAN
[TextEntry] : See above note for recommended management. A copy of genetic testing results and clinic note will be sent to  the referring cardiologist   or physician We encourage sharing these results with family members, particularly [HFE] They have a risk to have inherited the same mutation.  Other family may benefit from genetic testing, and should contact a certified genetic counselor specializing in cardiac conditions.  Due to HIPAA and [place] laws, Genetics is unable to directly contact other family at risk. Contact the Cardiogenomics program at [facility] or the lab directly every few years to check on any changes in interpretation of a VUS, or if there are changes in the personal or family cardiac history.   Long-term management and surveillance for patient should be based on the patients clinical treatment as recommended by their cardiologist.   Any changes in cardiac surveillance should be discussed with the patients physician.  We remain available should there be any new information for personal or family history.  If there are any additional questions, please feel free to call the Cardiogenomics program at [facility], Yamileth Simons MS, MYRIAM or Javi Covarrubias MD, PhD at [phone] Time spent counseling the patient during the visit was 45 min. I spent 45 minutes on the encounter   Patient seen with Yamileth Simons MS, CGC, board certified genetic counsellor

## 2024-09-18 NOTE — REASON FOR VISIT
[FreeTextEntry3] : Dear Dr. Cortez and Dr. Ellington        . I saw your patient ROLDAN SONG on 09/16/2024 . Please see the note below for the assessment and plan.   ROLDAN SONG  was seen  for an initial consultation at the Cardiogenomics Program at [facility] on 07/22/2024.   Ms. SONG was referred by Dr. Cortez for hereditary cardiac predisposition risk assessment and counseling, due to hx syncope concern for ARVC and prolonged QT, FH SCD

## 2024-09-18 NOTE — FAMILY HISTORY
[FreeTextEntry1] : FamilyHistory_20_twCiteListControlStart FamilyHistory_20_twCiteListControlEnd [id number]  A four-generation family history was constructed and scanned into [employer].  Family history is significant for:  siblings brother 60 yo, hx "heart attack " 1 year ago, details unclear. father dec 59 hx cardiomyopathy, hx polycystic kidney disease paternal aunt polycystic kidney disease  children: 37 yo son hx palpitations, symptomatic  her maternal families originate from  and paternal families originate from [facility]  No Ashkenazi Zoroastrianism ancestry.  Family history was  negative for consanguinity   No family history of SIDS    [FreeTextEntry3] : [place]

## 2024-09-18 NOTE — REASON FOR VISIT
[FreeTextEntry3] : Dear Dr. Cortez and Dr. Ellington        . I saw your patient ROLDAN SONG on 09/16/2024 . Please see the note below for the assessment and plan.   ROLDAN SONG  was seen  for an initial consultation at the Cardiogenomics Program at [facility] on 07/22/2024.   Ms. SONG was referred by Dr. Cortez for hereditary cardiac predisposition risk assessment and counseling, due to hx syncope concern for ARVC and prolonged QT, FH SCD

## 2024-09-18 NOTE — FAMILY HISTORY
[FreeTextEntry1] : FamilyHistory_20_twCiteListControlStart FamilyHistory_20_twCiteListControlEnd [id number]  A four-generation family history was constructed and scanned into [employer].  Family history is significant for:  siblings brother 60 yo, hx "heart attack " 1 year ago, details unclear. father dec 59 hx cardiomyopathy, hx polycystic kidney disease paternal aunt polycystic kidney disease  children: 39 yo son hx palpitations, symptomatic  her maternal families originate from  and paternal families originate from [facility]  No Ashkenazi Baptism ancestry.  Family history was  negative for consanguinity   No family history of SIDS    [FreeTextEntry3] : [place]

## 2024-09-18 NOTE — SIGNATURES
[TextEntry] : Javi Covarrubias MD, PhD  Medical Director Program for Cardiac Genetics, Genomics and Precision Medicine Department of Cardiology [facility]  [employer] at [address] [place] [address] Tel: [phone] Fax: [phone]  ([facility] office) [address], 3rd floor (between [address]) [place] [address] (p) [phone] (f) [phone]

## 2024-09-18 NOTE — HISTORY OF PRESENT ILLNESS
[FreeTextEntry1] : ROLDAN SONG is a 60 yo F PMH bipolar disorder, anxiety, HLD, hypertriglyceridemia, preDM . Clinically there is concern for ARVC vs long QT her family history is significant for SCD.  2 months prior had syncope neurology workup was negative, with facial trauma no prodrome, thinks short LOC, had pain in her head but no post syncopal confusion    similar syncopal event 5 yrs ago, with facial trauma broke 5 teeth  she underwent genetic testing and today presents for results

## 2024-09-18 NOTE — CONSULT LETTER
[Dear  ___] : Dear ~CLOTILDE, [Consult Letter:] : I had the pleasure of evaluating your patient, [unfilled]. [Please see my note below.] : Please see my note below. [Consult Closing:] : Thank you very much for allowing me to participate in the care of this patient.  If you have any questions, please do not hesitate to contact me. [Sincerely,] : Sincerely, [DrPatrice  ___] : Dr. COSTELLO [DrPatrice ___] : Dr. COSTELLO [FreeTextEntry3] : Javi Covarrubias MD, PhD  Medical Director Program for Cardiac Genetics, Genomics and Precision Medicine Department of Cardiology [facility]  [employer] at [address] [place] [address] Tel: [phone] Fax: [phone]  ([facility] office) [address], 3rd floor (between [address] and [address]) [place] [address] (p) [phone] (f) [phone]

## 2024-09-18 NOTE — ASSESSMENT
[TextEntry] : ROLDAN SONG  is a 59 year F  with a history of bipolar disorder, anxiety, HLD, hypertriglyceridemia, . Clinically there is concern for ARVC vs long QT her family history is significant for SCD. she underwent genetic testing  Results did not identifit any know genetic causes for ARVC and prolonged QT, FH SCD.   No known pathogenic variants were identified in any of the 138 genes evaluated  a variant of uncertain significance (VUS)  was identified in the MYH6  gene that is inconclusive and low clinical suspicion.  She was also found to be a carrier for hemochromatosis  the limitations of genetic testing were discussed with ROLDAN SONG     for her recommend continued care as per her cardiologist. Agree with the loop monitor recordings. Consider avoiding QT prolonging medications as listed on [email or web address]  for her family At this time we recommend all of her  first degree relatives undergo a clinical cardiac evaluation with history, physical exam, EKG and ECHO. If their initial clinical evaluation is normal they should continued to have clinical cardiac evaluation every 3-5 years (for adults).   any family members who wish to determine if they are also carriers for hemochromatosis can undergo testing via medical genetics or with their PCP   Genetic knowledge changes rapidly.  We encourage re-contacting the cardiogenomics program at [facility] if there are significant changes in family or personal health, and annually. ROLDAN SONG expressed understanding of the presented information and satisfaction with having her questions and concerns addressed.   EKG performed today for the above diagnosis.

## 2024-09-18 NOTE — PHYSICAL EXAM
[Extraocular Movements Intact] : extraocular movements were intact [Normal] : without joint laxity or contractures [Pectus Deformity] : no pectus deformity [Scoliosis] : no scoliosis [Tremor] : no tremor [de-identified] : 5/5 Ue and LE strength

## 2024-09-18 NOTE — SIGNATURES
[TextEntry] : Javi Covarrubias MD, PhD  Medical Director Program for Cardiac Genetics, Genomics and Precision Medicine Department of Cardiology [facility]  [employer] at [address] [place] [address] Tel: [phone] Fax: [phone]  ([facility] office) [address], 3rd floor (between [address] and [address]) [place] [address] (p) [phone] (f) [phone]

## 2024-09-18 NOTE — DISCUSSION/SUMMARY
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   No known pathogenic variants were identified in any of the 138 genes evaluated  a variant of uncertain significance (VUS)  was identified in the MYH6  gene that is inconclusive and low clinical suspicion.  She was also found to be a carrier for hemochromatosis  the limitations of genetic testing were discussed with ROLDAN SONG     for her recommend continued care as per her cardiologist. Agree with the loop monitor recordings. Consider avoiding QT prolonging medications as listed on [email or web address]  for her family At this time we recommend all of her  first degree relatives undergo a clinical cardiac evaluation with history, physical exam, EKG and ECHO. If their initial clinical evaluation is normal they should continued to have clinical cardiac evaluation every 3-5 years (for adults).   any family members who wish to determine if they are also carriers for hemochromatosis can undergo testing via medical genetics or with their PCP     HFE carrier A single pathogenic homozygous recessive variant was detected in the "human hemachromatosis protein" (High Iron Fe)  HFE gene.  He is heterozygous, and carries 1 copy of the H63D mutation.  This is consistent with a diagnosis of being a carrier hemochromatosis. The HFE gene encodes a cell surface protein that interacts with the transferrin receptors to regulate cellular iron uptake.  Missense mutations in the HFE are responsible for autosomal recessive hereditary hemochromatosis.  Individuals  with hemochromatosis have excess iron deposition in various tissue lading to pain, lethargy, DM, Cirrhosis and cardiomyopathies.   There is variable penetrance and some individuals lack clinical symptoms but have evidence of tissue iron overload.  The H63D variant is known to disrupt normal protein function. Carriers of the H63D mutation are recommended to have iron studies including serum ferritin and transferrin saturation.  .name 's children have a 50% chance of being carriers and should undergo genetic testing and genetic counselling pre-conception. Targeted testing for other family members to determine if they are carriers may be considered.  Patients heterozygous for HFE H63D have been shown to have a more complicated course following hepatitis C infection and more aggressive cirrhosis with alcoholism. This was also discussed with the patient.     any family members who wish to determine if they are also carriers for hemochromatosis can undergo testing via medical genetics or with their PCP  from genedx report  HFE Expressed primarily in the liver and intestines, the HFE gene encodes a cell surface protein that interacts with transferrin receptors to regulate cellular iron uptake (PMID: 72625621). Missense pathogenic variants in HFE can cause autosomal recessive hereditary hemochromatosis (HH), a highly phenotypically variable disorder with markedly reduced penetrance. In clinically affected individuals, excess iron is absorbed by the digestive tract and stored in various tissues, causing symptoms such as pain, lethargy, increased skin pigmentation, weight loss, arthritis, hepatic cirrhosis, diabetes, cardiomyopathy, and arrhythmias (PMID: 47984375, 88260234). At least half of individuals homozygous for C282Y, the most common pathogenic variant, do not develop clinical or biochemical evidence of iron overload (PMID: 34616325). In addition, some individuals develop biochemical evidence of HH but do not have clinical symptoms. Additionally, only a small proportion of adults with compound heterozygosity of C282Y/H63D and C282Y/S65C or homozygosity of H63D develop mild iron overload and this is usually in the presence of concomitant liver disease (PMID: 36031979). Males are more likely than females to have clinical symptoms of HH. Although missense pathogenic variants account for most cases of HH, truncating pathogenic variants have also been reported (PMID: 72367815). Pathogenic variants in HFE have also been associated with an increased susceptibility to other disorders that may be influenced by iron levels, including porphyria and Alzheimers disease (PMID: 71164145, 88667333, 13214020). p.(Mfv09Gfr) (CAT>GAT): c.187 C>G in exon 2 of the HFE gene (NM_000410.3) Common variant associated with hereditary hemochromatosis Observed in the homozygous state in multiple unrelated patients in published literature associated with elevated serum transferrin and transferrin saturation, but clinical symptoms and iron overload were not increased compared to individuals without an HFE variant (PMID: 83235353, 7691729) Published functional studies demonstrate a damaging effect (PMID: 37210099, 53641576) May be more clinically relevant when present in the compound heterozygous state with the p.(C282Y) variant, although most individuals do not develop clinical hemochromatosis symptoms, even if they have biochemical parameters consistent with hemochromatosis (PMID: 81599051, 06505318) When present with p.(C282Y), the variants are likely inherited in trans (on separate alleles) as they have been reported to have arisen as independent  alleles (PMID: 67874826) We interpret this as a Pathogenic Variant. -- MYH6 GENE SUMMARY MYH6 encodes the alpha heavy chain subunit of the cardiac myosin protein, which is a component of cardiac muscle (PMID: 53760229). Pathogenic variants in the MYH6 gene are associated with various autosomal dominant cardiac conditions primarily including cardiomyopathies and congenital heart malformations, all characterized by reduced penetrance and variable expressivity (PMID: 55172733, 05143443, 43008036, 33837025). Dilated (DCM) and hypertrophic (HCM) cardiomyopathies are most commonly described, although arrhythmogenic (ACM), left ventricular noncompaction (LVNC), and peripartum cardiomyopathies are also reported (PMID: 26504242, 41958182, 94253017). Various MYH6-related congenital heart malformations include atrial septal defects, tricuspid atresia, hypoplastic left heart syndrome, coarctation of the aorta, and Shone's complex, among others (PMID: 69378093, 89005739, 58739124, 09308558). In addition, various arrhythmogenic changes in the absence of cardiomyopathy and/or congenital heart malformations have been reported (PMID: 06744578, 48146915, 53184989, 63480265); however, additional evidence is needed to further characterize a possible association between variants in the MYH6 gene and other cardiac phenotypes. The majority of reported pathogenic variants in the MYH6 gene are missense variants (HGMD). p.(Lgz780Dij) (CGC>TGC): c.1702 C>T in exon 15 of the MYH6 gene (NM_002471.3) Identified in a patient with DCM, a patient with LVNC, and a patient who  suddenly in his sleep with HCM on autopsy who also harbored a pathogenic variant in the MYBPC3 gene (PMID: 41400167, 65154476, 45655283) Observed in large population cohorts (gnomAD; internal data) In silico analysis supports that this missense variant has a deleterious effect on protein structure/function We interpret this as a Variant of Uncertain Significance.

## 2024-09-18 NOTE — PLAN
[TextEntry] : See above note for recommended management. A copy of genetic testing results and clinic note will be sent to  the referring cardiologist   or physician We encourage sharing these results with family members, particularly [HFE] They have a risk to have inherited the same mutation.  Other family may benefit from genetic testing, and should contact a certified genetic counselor specializing in cardiac conditions.  Due to HIPAA and [place] laws, Genetics is unable to directly contact other family at risk. Contact the Cardiogenomics program at [facility] or the lab directly every few years to check on any changes in interpretation of a VUS, or if there are changes in the personal or family cardiac history.   Long-term management and surveillance for patient should be based on the patients clinical treatment as recommended by their cardiologist.   Any changes in cardiac surveillance should be discussed with the patients physician.  We remain available should there be any new information for personal or family history.  If there are any additional questions, please feel free to call the Cardiogenomics program at [facility], Yamileth Simons MS, MYRIAM or Javi Covarrubias MD, PhD at [phone] Time spent counseling the patient during the visit was 45 min. I spent 45 minutes on the encounter   Patient seen with Yamileth Simons MS, CGC, board certified genetic counsellor

## 2024-09-18 NOTE — ASSESSMENT
[TextEntry] : ROLDAN SONG  is a 59 year F  with a history of bipolar disorder, anxiety, HLD, hypertriglyceridemia, . Clinically there is concern for ARVC vs long QT her family history is significant for SCD. she underwent genetic testing  Results did not identifit any know genetic causes for ARVC and prolonged QT, FH SCD.   No known pathogenic variants were identified in any of the 138 genes evaluated  a variant of uncertain significance (VUS)  was identified in the MYH6  gene that is inconclusive and low clinical suspicion.  She was also found to be a carrier for hemochromatosis  the limitations of genetic testing were discussed with ROLDAN SONG     for her recommend continued care as per her cardiologist. Agree with the loop monitor recordings. Consider avoiding QT prolonging medications as listed on [email or web address]  for her family At this time we recommend all of her  first degree relatives undergo a clinical cardiac evaluation with history, physical exam, EKG and ECHO. If their initial clinical evaluation is normal they should continued to have clinical cardiac evaluation every 3-5 years (for adults).   any family members who wish to determine if they are also carriers for hemochromatosis can undergo testing via medical genetics or with their PCP   Genetic knowledge changes rapidly.  We encourage re-contacting the cardiogenomics program at [facility] if there are significant changes in family or personal health, and annually. ROLDAN SONG expressed understanding of the presented information and satisfaction with having her questions and concerns addressed.   EKG performed today for the above diagnosis.

## 2025-01-28 NOTE — ASU DISCHARGE PLAN (ADULT/PEDIATRIC) - CARE PROVIDER_API CALL
Eric Sood)  Surgery  [address], [place] [address]  Phone: [phone]  Fax: [phone]  Follow Up Time: 3 (mild pain)